# Patient Record
Sex: FEMALE | HISPANIC OR LATINO | URBAN - METROPOLITAN AREA
[De-identification: names, ages, dates, MRNs, and addresses within clinical notes are randomized per-mention and may not be internally consistent; named-entity substitution may affect disease eponyms.]

---

## 2018-08-17 ENCOUNTER — APPOINTMENT (RX ONLY)
Dept: URBAN - METROPOLITAN AREA TELEMEDICINE 2 | Facility: TELEMEDICINE | Age: 48
Setting detail: DERMATOLOGY
End: 2018-08-17

## 2018-08-17 DIAGNOSIS — Z41.9 ENCOUNTER FOR PROCEDURE FOR PURPOSES OTHER THAN REMEDYING HEALTH STATE, UNSPECIFIED: ICD-10-CM

## 2018-08-17 PROCEDURE — ? FILLERS

## 2018-08-17 PROCEDURE — ? COSMETIC CONSULTATION: FILLERS

## 2018-08-17 PROCEDURE — ? OTHER (COSMETIC)

## 2018-08-17 PROCEDURE — ? RECOMMENDATIONS

## 2018-08-17 NOTE — PROCEDURE: FILLERS
Additional Anesthesia Volume In Cc: 6
Nasolabial Folds Filler Volume In Cc: 0
Marionette Lines Filler Volume In Cc: 1
Use Map Statement For Sites (Optional): No
Anesthesia Type: 1% lidocaine with epinephrine
Detail Level: Detailed
Anesthesia Volume In Cc: 0.5
Topical Anesthesia?: BLT cream (benzocaine 20%, lidocaine 6%, tetracaine 4%)
Consent: Written consent obtained. Risks include but not limited to bruising, beading, irregular texture, ulceration, infection, allergic reaction, scar formation, incomplete augmentation, temporary nature, procedural pain.
Lot #: [id number]
Price (Use Numbers Only, No Special Characters Or $): 979
Filler: Juvederm Ultra Plus XC
Post-Care Instructions: Patient instructed to apply ice to reduce swelling.
Lot #: [id number]
Map Statment: See Attach Map for Complete Details
Expiration Date (Month Year): 08/08/2019
Reconstitution Date: 08/17/2018
Expiration Date (Month Year): 7-

## 2018-08-17 NOTE — PROCEDURE: RECOMMENDATIONS
Recommendations (Free Text): Discussed consider 2 syringes of juvederm ultra plus to nasolabial folds vs. Radiesse
Recommendation Preamble: The following recommendations were made during the visit:
Detail Level: Simple

## 2018-12-13 ENCOUNTER — APPOINTMENT (RX ONLY)
Dept: URBAN - METROPOLITAN AREA TELEMEDICINE 2 | Facility: TELEMEDICINE | Age: 48
Setting detail: DERMATOLOGY
End: 2018-12-13

## 2018-12-13 DIAGNOSIS — Z41.9 ENCOUNTER FOR PROCEDURE FOR PURPOSES OTHER THAN REMEDYING HEALTH STATE, UNSPECIFIED: ICD-10-CM

## 2018-12-13 PROCEDURE — ? OTHER (COSMETIC)

## 2018-12-13 PROCEDURE — ? FILLERS

## 2018-12-13 PROCEDURE — ? COSMETIC CONSULTATION: FILLERS

## 2018-12-13 ASSESSMENT — LOCATION ZONE DERM
LOCATION ZONE: FACE
LOCATION ZONE: LIP

## 2018-12-13 ASSESSMENT — LOCATION DETAILED DESCRIPTION DERM
LOCATION DETAILED: LEFT INFERIOR MEDIAL MALAR CHEEK
LOCATION DETAILED: RIGHT UPPER CUTANEOUS LIP

## 2018-12-13 ASSESSMENT — LOCATION SIMPLE DESCRIPTION DERM
LOCATION SIMPLE: LEFT CHEEK
LOCATION SIMPLE: RIGHT LIP

## 2018-12-13 NOTE — PROCEDURE: FILLERS
Map Statment: See Attach Map for Complete Details
Lateral Face Filler Volume In Cc: 0
Marionette Lines Filler Volume In Cc: 0.3
Reconstitution Date: 12/13/2018
Expiration Date (Month Year): 08/04/2019
Include Cannula Information In Note?: No
Anesthesia Type: 1% lidocaine with epinephrine
Anesthesia Volume In Cc: 0.5
Additional Anesthesia Volume In Cc: 6
Consent: Written consent obtained. Risks include but not limited to bruising, beading, irregular texture, ulceration, infection, allergic reaction, scar formation, incomplete augmentation, temporary nature, procedural pain.
Post-Care Instructions: Patient instructed to apply ice to reduce swelling.
Topical Anesthesia?: BLT cream (benzocaine 20%, lidocaine 6%, tetracaine 4%)
Filler: Radiesse+
Lot #: [id number]
Detail Level: Detailed
Expiration Date (Month Year): 7-
Price (Use Numbers Only, No Special Characters Or $): 725
Nasolabial Folds Filler Volume In Cc: 1.2
Lot #: [id number]

## 2020-02-27 ENCOUNTER — APPOINTMENT (RX ONLY)
Dept: URBAN - METROPOLITAN AREA TELEMEDICINE 2 | Facility: TELEMEDICINE | Age: 50
Setting detail: DERMATOLOGY
End: 2020-02-27

## 2020-02-27 DIAGNOSIS — Z41.9 ENCOUNTER FOR PROCEDURE FOR PURPOSES OTHER THAN REMEDYING HEALTH STATE, UNSPECIFIED: ICD-10-CM

## 2020-02-27 PROCEDURE — ? COSMETIC CONSULTATION: FILLERS

## 2020-02-27 PROCEDURE — ? OTHER

## 2020-02-27 PROCEDURE — ? FILLERS

## 2020-02-27 NOTE — PROCEDURE: FILLERS
Additional Area 3 Volume In Cc: 0
Consent: Written consent obtained. Risks include but not limited to bruising, beading, irregular texture, ulceration, infection, allergic reaction, scar formation, incomplete augmentation, temporary nature, procedural pain.
Additional Anesthesia Volume In Cc: 6
Topical Anesthesia?: BLT cream (benzocaine 20%, lidocaine 10%, tetracaine 10%)
Filler: Radiesse+
Detail Level: Detailed
Price (Use Numbers Only, No Special Characters Or $): 081
Use Map Statement For Sites (Optional): No
Nasolabial Folds Filler Volume In Cc: 1.1
Lot #: [id number]
Map Statment: See Attach Map for Complete Details
Lot #: [id number]
Marionette Lines Filler Volume In Cc: 0.4
Reconstitution Date: 2/27/20
Expiration Date (Month Year): 6/3/2021
Expiration Date (Month Year): 7-
Anesthesia Type: 1% lidocaine with epinephrine
Anesthesia Volume In Cc: 0.5
Post-Care Instructions: Patient instructed to apply ice to reduce swelling.

## 2021-08-18 ENCOUNTER — APPOINTMENT (RX ONLY)
Dept: URBAN - METROPOLITAN AREA TELEMEDICINE 2 | Facility: TELEMEDICINE | Age: 51
Setting detail: DERMATOLOGY
End: 2021-08-18

## 2021-08-18 DIAGNOSIS — Z41.9 ENCOUNTER FOR PROCEDURE FOR PURPOSES OTHER THAN REMEDYING HEALTH STATE, UNSPECIFIED: ICD-10-CM

## 2021-08-18 PROCEDURE — ? FILLERS

## 2021-08-18 PROCEDURE — ? COSMETIC CONSULTATION: FILLERS

## 2021-08-18 PROCEDURE — ? OTHER

## 2021-08-18 NOTE — PROCEDURE: FILLERS
Additional Area 2 Volume In Cc: 0
Detail Level: Detailed
Filler: Radiesse+
Lot #: [id number]
Price (Use Numbers Only, No Special Characters Or $): 700.00
Nasolabial Folds Filler Volume In Cc: 1
Use Map Statement For Sites (Optional): No
Map Statment: See Attach Map for Complete Details
Expiration Date (Month Year): 7-
Consent: Written consent obtained. Risks include but not limited to bruising, beading, irregular texture, ulceration, infection, allergic reaction, scar formation, incomplete augmentation, temporary nature, procedural pain.
Marionette Lines Filler Volume In Cc: 0.5
Lot #: [id number]
Post-Care Instructions: Patient instructed to apply ice to reduce swelling.
Reconstitution Date: 8/18/2021
Expiration Date (Month Year): 11/27/2022
Anesthesia Type: 1% lidocaine with epinephrine
Additional Anesthesia Volume In Cc: 6
Topical Anesthesia?: BLT cream (benzocaine 20%, lidocaine 6%, tetracaine 4%)